# Patient Record
Sex: FEMALE | Race: WHITE | NOT HISPANIC OR LATINO | URBAN - METROPOLITAN AREA
[De-identification: names, ages, dates, MRNs, and addresses within clinical notes are randomized per-mention and may not be internally consistent; named-entity substitution may affect disease eponyms.]

---

## 2022-03-24 ENCOUNTER — OFFICE VISIT (OUTPATIENT)
Dept: CARDIOLOGY | Facility: CLINIC | Age: 47
End: 2022-03-24

## 2022-03-24 VITALS
DIASTOLIC BLOOD PRESSURE: 100 MMHG | HEART RATE: 86 BPM | BODY MASS INDEX: 28.12 KG/M2 | SYSTOLIC BLOOD PRESSURE: 160 MMHG | WEIGHT: 179.2 LBS | HEIGHT: 67 IN

## 2022-03-24 DIAGNOSIS — Z82.49 FAMILY HISTORY OF CORONARY ARTERY DISEASE: ICD-10-CM

## 2022-03-24 DIAGNOSIS — E78.5 HYPERLIPIDEMIA, UNSPECIFIED HYPERLIPIDEMIA TYPE: ICD-10-CM

## 2022-03-24 DIAGNOSIS — I10 ESSENTIAL HYPERTENSION: Primary | ICD-10-CM

## 2022-03-24 PROCEDURE — 99204 OFFICE O/P NEW MOD 45 MIN: CPT | Performed by: INTERNAL MEDICINE

## 2022-03-24 PROCEDURE — 93000 ELECTROCARDIOGRAM COMPLETE: CPT | Performed by: INTERNAL MEDICINE

## 2022-03-24 RX ORDER — CETIRIZINE HYDROCHLORIDE 10 MG/1
10 TABLET ORAL DAILY
COMMUNITY

## 2022-03-24 RX ORDER — DIPHENOXYLATE HYDROCHLORIDE AND ATROPINE SULFATE 2.5; .025 MG/1; MG/1
1 TABLET ORAL DAILY
COMMUNITY

## 2022-03-24 RX ORDER — LOSARTAN POTASSIUM 50 MG/1
TABLET ORAL DAILY
COMMUNITY

## 2022-03-24 RX ORDER — ROSUVASTATIN CALCIUM 5 MG/1
TABLET, COATED ORAL NIGHTLY
COMMUNITY

## 2022-03-24 RX ORDER — CHLORAL HYDRATE 500 MG
1000 CAPSULE ORAL DAILY
COMMUNITY

## 2022-03-24 RX ORDER — PROGESTERONE 100 MG/1
100 CAPSULE ORAL DAILY
COMMUNITY
Start: 2022-03-09

## 2022-03-24 RX ORDER — ALBUTEROL SULFATE 90 UG/1
AEROSOL, METERED RESPIRATORY (INHALATION) AS NEEDED
COMMUNITY

## 2022-03-24 RX ORDER — MELOXICAM 15 MG/1
15 TABLET ORAL AS NEEDED
COMMUNITY

## 2022-03-24 RX ORDER — ESTRADIOL 0.75 MG/1.25G
GEL, METERED TOPICAL
COMMUNITY
Start: 2022-03-09

## 2022-04-18 NOTE — PROGRESS NOTES
Date of Office Visit: 2022  Encounter Provider: Camacho Madrigal MD  Place of Service: [facility] CARDIOLOGY  Patient Name: Kylah Angel  :1975    Chief complaint: Hypertension, hyperlipidemia, family history of coronary artery disease.    History of Present Illness:    I had the pleasure of seeing the patient in cardiology office on 3/24/2022.  She is a very pleasant 47 year-old female with a history of hypertension, hyperlipidemia, treated obstructive sleep apnea, and family history of coronary artery disease who presents for evaluation.    The patient has a history of hypertension and hyperlipidemia.  Her blood pressure is elevated today at 160/100, although she states that she always has significant whitecoat hypertension, and her normal readings are in the 120-130 systolic range over the lower 80s.  Her blood pressure has gone up in the last 6 months, but really got worse after she had COVID-19 in 2022.  She is here mainly for risk factor modification because of her family history.  Her father had 3 stents placed at age 68.  She does exercise about 3-4 times per week without any difficulty.  She has not had any chest pain or shortness of breath.  Her EKG from today is normal.    Past Medical History:   Diagnosis Date   • Essential hypertension    • Other hyperlipidemia    • Overweight with body mass index (BMI) 25.0-29.9        Past Surgical History:   Procedure Laterality Date   •  SECTION     • FOOT SURGERY Left    • HERNIA REPAIR         Current Outpatient Medications on File Prior to Visit   Medication Sig Dispense Refill   • albuterol sulfate  (90 Base) MCG/ACT inhaler As Needed.     • cetirizine (zyrTEC) 10 MG tablet Take 10 mg by mouth Daily.     • Cholecalciferol 75 MCG (3000 UT) tablet Take 5,000 Units by mouth Daily.     • Estrogel 0.75 MG/1.25 GM (0.06%) topical gel APPLY 1.25 GRAMS TOPICALLY TO THE AFFECTED AREA DAILY     •  "losartan (COZAAR) 50 MG tablet Daily.     • meloxicam (MOBIC) 15 MG tablet Take 15 mg by mouth As Needed.     • multivitamin (THERAGRAN) tablet tablet Take 1 tablet by mouth Daily.     • Omega-3 Fatty Acids (fish oil) 1000 MG capsule capsule Take 1,000 mg by mouth Daily.     • Progesterone (PROMETRIUM) 100 MG capsule Take 100 mg by mouth Daily.     • rosuvastatin (CRESTOR) 5 MG tablet Every Night.       No current facility-administered medications on file prior to visit.     Allergies as of 03/24/2022   • (No Known Allergies)     Social History     Socioeconomic History   • Marital status:    Tobacco Use   • Smoking status: Never Smoker   • Smokeless tobacco: Never Used   • Tobacco comment: caffeine use- coffee   Substance and Sexual Activity   • Alcohol use: Yes     Comment: \"social\"   • Drug use: Never     Family History   Problem Relation Age of Onset   • Hypertension Mother    • Diabetes Mother    • Heart disease Father         3 stents   • Hypertension Father    • Cancer Father    • Heart attack Maternal Grandfather        Review of Systems   Respiratory: Positive for snoring.    Musculoskeletal: Positive for joint pain.   All other systems reviewed and are negative.     Objective:     Vitals:    03/24/22 1228   BP: 160/100   BP Location: Left arm   Pulse: 86   Weight: 81.3 kg (179 lb 3.2 oz)   Height: 170.2 cm (67\")     Body mass index is 28.07 kg/m².    Constitutional:       Appearance: Healthy appearance. Well-developed.   Eyes:      Conjunctiva/sclera: Conjunctivae normal.   HENT:      Head: Normocephalic and atraumatic.   Pulmonary:      Effort: Pulmonary effort is normal.      Breath sounds: Normal breath sounds.   Cardiovascular:      Normal rate. Regular rhythm.      Murmurs: There is no murmur.      No gallop.   Edema:     Peripheral edema absent.   Abdominal:      Palpations: Abdomen is soft.      Tenderness: There is no abdominal tenderness.   Musculoskeletal:      Cervical back: Neck supple. " Skin:     General: Skin is warm.   Neurological:      Mental Status: Alert and oriented to person, place, and time.   Psychiatric:         Behavior: Behavior normal.       Lab Review:     ECG 12 Lead    Date/Time: 3/24/2022 12:26 PM  Performed by: Camacho Madrigal MD  Authorized by: Camacho Madrigal MD   Comparison: not compared with previous ECG   Previous ECG: no previous ECG available  Rhythm: sinus rhythm  Rate: normal  BPM: 86    Clinical impression: normal ECG            Cardiac Procedures:      Assessment:       Diagnosis Plan   1. Essential hypertension  Lipid Panel    Hepatic Function Panel    CT Cardiac Calcium Score Without Dye   2. Hyperlipidemia, unspecified hyperlipidemia type  Lipid Panel    Hepatic Function Panel    CT Cardiac Calcium Score Without Dye   3. Family history of coronary artery disease  CT Cardiac Calcium Score Without Dye     Plan:       We discussed risk factor modification for coronary artery disease.  Her blood pressure typically runs in the 120-130 systolic range over the lower 80s.  She has significant whitecoat hypertension, and this likely accounts for her blood pressure today.  She is currently on losartan at 50 mg/day.  She is going to watch her blood pressure, and if it goes up significantly, this may need to be adjusted further.  She does wear a CPAP mask for her sleep apnea (started wearing this about 4 months ago again), which will help with blood pressure management overall.    She is currently taking Crestor 5 mg/day and fish oil 1000 mg/day.  I am going to check a lipid panel and liver function tests at this time.  Her medications can be adjusted if needed.  Her EKG is normal, and she is able to exercise 3-4 times a week.  However, her father had 3 stents placed at age 68.  I feel an excellent test for her would be a coronary calcium CT scan.  We discussed this, and further plans will be made after the test results are known.

## 2022-06-07 ENCOUNTER — APPOINTMENT (OUTPATIENT)
Dept: CT IMAGING | Facility: HOSPITAL | Age: 47
End: 2022-06-07

## 2022-08-17 ENCOUNTER — HOSPITAL ENCOUNTER (OUTPATIENT)
Dept: CT IMAGING | Facility: HOSPITAL | Age: 47
Discharge: HOME | End: 2022-08-17
Admitting: INTERNAL MEDICINE

## 2022-08-17 DIAGNOSIS — Z82.49 FAMILY HISTORY OF CORONARY ARTERY DISEASE: ICD-10-CM

## 2022-08-17 DIAGNOSIS — E78.5 HYPERLIPIDEMIA, UNSPECIFIED HYPERLIPIDEMIA TYPE: ICD-10-CM

## 2022-08-17 DIAGNOSIS — I10 ESSENTIAL HYPERTENSION: ICD-10-CM

## 2022-08-17 PROCEDURE — 75571 CT HRT W/O DYE W/CA TEST: CPT

## 2022-08-18 NOTE — PROGRESS NOTES
Can you please let her know that her total score was 0.2?  This is essentially 0.  The chances that she has coronary artery disease based on this scan is almost 0.  This is an excellent result, and I was very happy.  Thanks.    GM